# Patient Record
Sex: MALE | Employment: FULL TIME | URBAN - METROPOLITAN AREA
[De-identification: names, ages, dates, MRNs, and addresses within clinical notes are randomized per-mention and may not be internally consistent; named-entity substitution may affect disease eponyms.]

---

## 2018-09-04 ENCOUNTER — OFFICE VISIT (OUTPATIENT)
Dept: ORTHOPEDIC SURGERY | Age: 33
End: 2018-09-04

## 2018-09-04 ENCOUNTER — HOSPITAL ENCOUNTER (OUTPATIENT)
Dept: OCCUPATIONAL MEDICINE | Age: 33
Discharge: HOME | End: 2018-09-04
Attending: FAMILY MEDICINE

## 2018-09-04 VITALS
SYSTOLIC BLOOD PRESSURE: 132 MMHG | DIASTOLIC BLOOD PRESSURE: 86 MMHG | HEART RATE: 69 BPM | BODY MASS INDEX: 34.27 KG/M2 | HEIGHT: 72 IN | TEMPERATURE: 97.2 F | WEIGHT: 253 LBS | RESPIRATION RATE: 15 BRPM

## 2018-09-04 DIAGNOSIS — S39.012A LUMBAR STRAIN, INITIAL ENCOUNTER: ICD-10-CM

## 2018-09-04 DIAGNOSIS — M99.03 LUMBAR REGION SOMATIC DYSFUNCTION: ICD-10-CM

## 2018-09-04 DIAGNOSIS — M51.36 ANNULAR TEAR OF LUMBAR DISC: ICD-10-CM

## 2018-09-04 DIAGNOSIS — M99.04 SACRAL REGION SOMATIC DYSFUNCTION: ICD-10-CM

## 2018-09-04 DIAGNOSIS — M51.36 ANNULAR TEAR OF LUMBAR DISC: Primary | ICD-10-CM

## 2018-09-04 DIAGNOSIS — M99.05 PELVIC SOMATIC DYSFUNCTION: ICD-10-CM

## 2018-09-04 RX ORDER — ALBUTEROL SULFATE 90 UG/1
AEROSOL, METERED RESPIRATORY (INHALATION)
COMMUNITY

## 2018-09-04 RX ORDER — MELOXICAM 15 MG/1
TABLET ORAL
Qty: 90 TAB | Refills: 0 | Status: SHIPPED | OUTPATIENT
Start: 2018-09-04 | End: 2022-01-04

## 2018-09-04 NOTE — MR AVS SNAPSHOT
[address] [address] [facility] [address] 
[phone] Patient: Gustabo Ross MRN: [id number] :1985 Visit Information Date & Time Provider Department Dept. Phone Encounter #  
 2018  9:00 AM Shiva Villagran, Celina Carrillo [facility] [phone] Follow-up Instructions Return for after MRI lumbar for results. Upcoming Health Maintenance Date Due DTaP/Tdap/Td series (1 - Tdap) 10/3/2007 Influenza Age 5 to Adult 2018 Allergies as of 2018  Review Complete On: 2018 By: Shiva Villagran DO No Known Allergies Current Immunizations  Never Reviewed No immunizations on file. Not reviewed this visit You Were Diagnosed With   
  
 Codes Comments Annular tear of lumbar disc    -  Primary ICD-10-CM: M51.36 
ICD-9-CM: 722.52 Lumbar strain, initial encounter     ICD-10-CM: S39.012A ICD-9-CM: 847.2 Lumbar region somatic dysfunction     ICD-10-CM: M99.03 
ICD-9-CM: 739.3 Sacral region somatic dysfunction     ICD-10-CM: M99.04 
ICD-9-CM: 739.4 Pelvic somatic dysfunction     ICD-10-CM: M99.05 
ICD-9-CM: 739.5 Vitals BP Pulse Temp Resp Height(growth percentile) Weight(growth percentile) 132/86 69 97.2 °F (36.2 °C) 15 6' (1.829 m) 253 lb (114.8 kg) BMI Smoking Status 34.31 kg/m2 Never Smoker Vitals History BMI and BSA Data Body Mass Index Body Surface Area  
 34.31 kg/m 2 2.41 m 2 Preferred Pharmacy Pharmacy Name Phone Angelina Swanson [address], [address] [phone] Your Updated Medication List  
  
   
This list is accurate as of 18 10:22 AM.  Always use your most recent med list.  
  
  
  
  
 albuterol 90 mcg/actuation inhaler Commonly known as:  PROVENTIL HFA, VENTOLIN HFA, PROAIR HFA Take  by inhalation. ARTHRITIS STRENGTH BC POWDER PO Take  by mouth.  
  
 meloxicam 15 mg tablet Commonly known as:  MOBIC Take 1 tab daily as needed pain with food. Prescriptions Sent to Pharmacy Refills  
 meloxicam (MOBIC) 15 mg tablet 0 Sig: Take 1 tab daily as needed pain with food. Class: Normal  
 Pharmacy: [facility] [address] [facility], [address] [place] [address] #: [phone] We Performed the Following [place] OSTEOPATHIC MANIP,3-4 BODY REGN [id number] CPT(R)] Follow-up Instructions Return for after MRI lumbar for results. To-Do List   
 09/04/2018 Imaging:  XR SPINE LUMB 2 OR 3 V Introducing [facility] SERVICES! Dear Suraj Martinez: Thank you for requesting a Iterable account. Our records indicate that you already have an active Iterable account. You can access your account anytime at [email or web address] Did you know that you can access your hospital and ER discharge instructions at any time in Iterable? You can also review all of your test results from your hospital stay or ER visit. Additional Information If you have questions, please visit the Frequently Asked Questions section of the Iterable website at [email or web address] Remember, Iterable is NOT to be used for urgent needs. For medical emergencies, dial 911. Now available from your iPhone and Android! Please provide this summary of care documentation to your next provider. If you have any questions after today's visit, please call [phone].

## 2018-09-04 NOTE — PROGRESS NOTES
HISTORY OF PRESENT ILLNESS    Kehinde Cheema is a 33y.o. year old male comes in today as new patient for: low back pain    Patients symptoms have been present for 2 months. Pain level 7/10 low back, It has slightly improved with chiro and stretching. It is described as pain ow back after significant exercises 2 months ago using 25# plate and over did it. IMAGING: XR lumbar today shows minimal degerative changes w/ good alignment per my review. Social History     Social History    Marital status: UNKNOWN     Spouse name: N/A    Number of children: N/A    Years of education: N/A     Social History Main Topics    Smoking status: Never Smoker    Smokeless tobacco: Never Used    Alcohol use 8.4 oz/week     14 Cans of beer per week    Drug use: No    Sexual activity: Not Asked     Other Topics Concern    None     Social History Narrative    None     Current Outpatient Prescriptions   Medication Sig Dispense Refill    aspirin/salicylamide/caffeine (ARTHRITIS STRENGTH BC POWDER PO) Take  by mouth.  albuterol (PROVENTIL HFA, VENTOLIN HFA, PROAIR HFA) 90 mcg/actuation inhaler Take  by inhalation. Past Medical History:   Diagnosis Date    Asthma      History reviewed. No pertinent family history. ROS:  No num, tingle, incont, fever. All other systems reviewed and negative aside from that written in the HPI. Objective:  /86  Pulse 69  Temp 97.2 °F (36.2 °C)  Resp 15  Ht 6' (1.829 m)  Wt 253 lb (114.8 kg)  BMI 34.31 kg/m2  GEN:  Appears stated age in NAD. NEURO:  Sensation intact to light touch. Reflexes +1/4 patellar and Achilles bilaterally. M/S:  Examined seated and supine. Slump negative. Standing flexion test positive left  Stork positive left.   ASIS high left  Iliac crests high left Pubes high left Medial malleolus high left  Sacral base posterior left  ANA low left  Sphinx test Positive left TTA at L2, 4, 5 on left worse flexion Rib(s) not TTP and equal LE Strength +5/5 bilaterally Piriformis normal bilaterally  EXT:  no clubbing/cyanosis. no edema. SKIN: Warm & dry w/o rash. HEENT: Conjunctiva/lids WNL. External canals/nares WNL. Tongue midline. PERRL, EOMI. Hearing intact. NECK: Trachea midline. Supple, Full ROM. No thyromegaly. CARDIAC: No edema. LUNGS: Normal effort. ABD: Soft, no masses. No HSM. PSYCH: A+O x3. Appropriate judgment and insight. Assessment/Plan:     ICD-10-CM ICD-9-CM    1. Annular tear of lumbar disc M51.36 722.52    2. Lumbar strain, initial encounter S39.012A 847.2    3. Lumbar region somatic dysfunction M99.03 739.3    4. Sacral region somatic dysfunction M99.04 739.4    5. Pelvic somatic dysfunction M99.05 739.5      Orders Placed This Encounter    XR SPINE LUMB 2 OR 3 V     Standing Status:   Future     Standing Expiration Date:   10/5/2019     Order Specific Question:   Reason for Exam     Answer:   Significant lumbar pain 8 weeks despite conservative treatment. Order Specific Question:   Is Patient Allergic to Contrast Dye? Answer:   Unknown    [place] OSTEOPATHIC MANIP,3-4 BODY REGN    meloxicam (MOBIC) 15 mg tablet     Sig: Take 1 tab daily as needed pain with food. Dispense:  90 Tab     Refill:  0     Patient (or guardian if minor) verbalizes understanding of evaluation and plan. Verbal consent obtained. Lumbar, Pelvic and Sacral SD treated with ME and HVLA. Correction of previous malalignments verified after Tx. Pt tolerated well. Notes improvement of Sx and pain is now rated 6-7/10. HEP/stretches daily. Discussed stretching/strengthening/posture.

## 2018-09-12 ENCOUNTER — HOSPITAL ENCOUNTER (OUTPATIENT)
Age: 33
Discharge: HOME | End: 2018-09-12
Attending: FAMILY MEDICINE
Payer: COMMERCIAL

## 2018-09-12 DIAGNOSIS — S39.012A LUMBAR STRAIN, INITIAL ENCOUNTER: ICD-10-CM

## 2018-09-12 DIAGNOSIS — M51.36 ANNULAR TEAR OF LUMBAR DISC: ICD-10-CM

## 2018-09-12 PROCEDURE — 72148 MRI LUMBAR SPINE W/O DYE: CPT

## 2018-09-25 ENCOUNTER — OFFICE VISIT (OUTPATIENT)
Dept: ORTHOPEDIC SURGERY | Age: 33
End: 2018-09-25

## 2018-09-25 VITALS
DIASTOLIC BLOOD PRESSURE: 78 MMHG | HEART RATE: 84 BPM | RESPIRATION RATE: 15 BRPM | HEIGHT: 72 IN | TEMPERATURE: 98 F | WEIGHT: 254 LBS | BODY MASS INDEX: 34.4 KG/M2 | SYSTOLIC BLOOD PRESSURE: 125 MMHG

## 2018-09-25 DIAGNOSIS — M51.36 BULGING LUMBAR DISC: Primary | ICD-10-CM

## 2018-09-25 DIAGNOSIS — M47.816 SPONDYLOSIS OF LUMBAR REGION WITHOUT MYELOPATHY OR RADICULOPATHY: ICD-10-CM

## 2018-09-25 DIAGNOSIS — M99.03 LUMBAR REGION SOMATIC DYSFUNCTION: ICD-10-CM

## 2018-09-25 DIAGNOSIS — M99.05 PELVIC SOMATIC DYSFUNCTION: ICD-10-CM

## 2018-09-25 DIAGNOSIS — M99.04 SACRAL REGION SOMATIC DYSFUNCTION: ICD-10-CM

## 2018-09-25 NOTE — PROGRESS NOTES
HISTORY OF PRESENT ILLNESS    Shannon Kearney is a 33y.o. year old male comes in today to be evaluated and treated for: Low back pan/MRI results    Sinc elast appt pain improved and down to 4/10 but minimal activity. No numb/tingle/swell/bruise. IMAGING: MRI Lumbar 9/12/18  IMPRESSION:  1. Far right lateral disc osteophyte complex of L1-2 with mild discogenic  endplate edema, a potential source of back pain. Mild amount of adjacent psoas  muscle strain. 2. Otherwise minimal degenerative disc disease.  -Patent canal and foramina    Social History     Social History    Marital status: UNKNOWN     Spouse name: N/A    Number of children: N/A    Years of education: N/A     Social History Main Topics    Smoking status: Never Smoker    Smokeless tobacco: Never Used    Alcohol use 8.4 oz/week     14 Cans of beer per week    Drug use: No    Sexual activity: Not Asked     Other Topics Concern    None     Social History Narrative     Current Outpatient Prescriptions   Medication Sig Dispense Refill    aspirin/salicylamide/caffeine (ARTHRITIS STRENGTH BC POWDER PO) Take  by mouth.  albuterol (PROVENTIL HFA, VENTOLIN HFA, PROAIR HFA) 90 mcg/actuation inhaler Take  by inhalation.  meloxicam (MOBIC) 15 mg tablet Take 1 tab daily as needed pain with food. 80 Tab 0     Past Medical History:   Diagnosis Date    Asthma      History reviewed. No pertinent family history. ROS:  See HPI    Objective:  /78  Pulse 84  Temp 98 °F (36.7 °C)  Resp 15  Ht 6' (1.829 m)  Wt 254 lb (115.2 kg)  BMI 34.45 kg/m2  GEN:  Appears stated age in NAD. NEURO:  Sensation intact to light touch. Reflexes +1/4 patellar and Achilles bilaterally. M/S:  Examined seated and supine. Slump negative. Standing flexion test positive right Stork positive left.   ASIS high left  Iliac crests = Pubes high left Medial malleolus high left  Sacral base posterior left  ANA low left  Sphinx test Positive left TTA at L4 on left worse flexion Rib(s) not TTP and equal LE Strength +5/5 bilaterally Piriformis normal bilaterally  EXT:  no clubbing/cyanosis. no edema. Assessment/Plan:     ICD-10-CM ICD-9-CM    1. Bulging lumbar disc M51.26 722.10 REFERRAL TO ORTHOPEDICS   2. Spondylosis of lumbar region without myelopathy or radiculopathy M47.816 721.3 REFERRAL TO ORTHOPEDICS   3. Pelvic somatic dysfunction M99.05 739.5 HI OSTEOPATHIC MANIP,3-4 BODY REGN   4. Sacral region somatic dysfunction M99.04 739.4 HI OSTEOPATHIC MANIP,3-4 BODY REGN   5. Lumbar region somatic dysfunction M99.03 739.3 HI OSTEOPATHIC MANIP,3-4 BODY REGN     Patient (or guardian if minor) verbalizes understanding of evaluation and plan. Verbal consent obtained. Lumbar, Pelvic and Sacral SD treated with ME. Correction of previous malalignments verified after Tx. Pt tolerated well. Notes improvement of Sx and pain is now rated 3/10. HEP/stretches daily. Discussed stretching/strengthening/posture. Will refer PMR as prefers eval to PT as wants to continue to be able to lift weights. Mobic PRN. Return as needed.

## 2018-09-25 NOTE — MR AVS SNAPSHOT
[address] [address] [place] [address] 
[phone] Patient: Jorge A Hernandez MRN: [id number] :1985 Visit Information Date & Time Provider Department Dept. Phone Encounter #  
 2018  9:00 AM Celina [facility] [phone] Follow-up Instructions Return if symptoms worsen or fail to improve. Follow-up and Disposition History Upcoming Health Maintenance Date Due DTaP/Tdap/Td series (1 - Tdap) 10/3/2007 Influenza Age 5 to Adult 2018 Allergies as of 2018  Review Complete On: 2018 By: Reba Cronin DO No Known Allergies Current Immunizations  Never Reviewed No immunizations on file. Not reviewed this visit You Were Diagnosed With   
  
 Codes Comments Bulging lumbar disc    -  Primary ICD-10-CM: M51.26 
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 
ICD-9-CM: 739.5 Sacral region somatic dysfunction     ICD-10-CM: M99.04 
ICD-9-CM: 739.4 Lumbar region somatic dysfunction     ICD-10-CM: M99.03 
ICD-9-CM: 739.3 Vitals BP Pulse Temp Resp Height(growth percentile) Weight(growth percentile) 125/78 84 98 °F (36.7 °C) 15 6' (1.829 m) 254 lb (115.2 kg) BMI Smoking Status 34.45 kg/m2 Never Smoker Vitals History BMI and BSA Data Body Mass Index Body Surface Area 34.45 kg/m 2 2.42 m 2 Preferred Pharmacy Pharmacy Name Phone Cassius Swift [address], [address] [facility] [phone] Your Updated Medication List  
  
   
This list is accurate as of 18  9:32 AM.  Always use your most recent med list.  
  
  
  
  
 albuterol 90 mcg/actuation inhaler Commonly known as:  PROVENTIL HFA, VENTOLIN HFA, PROAIR HFA Take  by inhalation. ARTHRITIS STRENGTH BC POWDER PO Take  by mouth.  
  
 meloxicam 15 mg tablet Commonly known as:  MOBIC Take 1 tab daily as needed pain with food. We Performed the Following [place] OSTEOPATHIC MANIP,3-4 BODY REGN [id number] CPT(R)] REFERRAL TO ORTHOPEDICS [VBZ147 Custom] Follow-up Instructions Return if symptoms worsen or fail to improve. Referral Information Referral ID Referred By Referred To  
  
 [id number] NAKIA PICHARDO [facility], . Milli Serrano MD   
   [address] Phone: [phone] Fax: [phone] Visits Status Start Date End Date 1 New Request 9/25/18 9/25/19 If your referral has a status of pending review or denied, additional information will be sent to support the outcome of this decision. Patient Instructions Low Back Arthritis: Exercises Your Care Instructions Here are some examples of typical rehabilitation exercises for your condition. Start each exercise slowly. Ease off the exercise if you start to have pain. Your doctor or physical therapist will tell you when you can start these exercises and which ones will work best for you. When you are not being active, find a comfortable position for rest. Some people are comfortable on the floor or a medium-firm bed with a small pillow under their head and another under their knees. Some people prefer to lie on their side with a pillow between their knees. Don't stay in one position for too long. Take short walks (10 to 20 minutes) every 2 to 3 hours. Avoid slopes, hills, and stairs until you feel better. Walk only distances you can manage without pain, especially leg pain. How to do the exercises Pelvic tilt 1. Lie on your back with your knees bent. 2.  \"Brace\" your stomach-tighten your muscles by pulling in and imagining your belly button moving toward your spine. 3. Press your lower back into the floor. You should feel your hips and pelvis rock back. 4. Hold for 6 seconds while breathing smoothly. 5. Relax and allow your pelvis and hips to rock forward. 6. Repeat 8 to 12 times. Back stretches 1. Get down on your hands and knees on the floor. 2. Relax your head and allow it to droop. Round your back up toward the ceiling until you feel a nice stretch in your upper, middle, and lower back. Hold this stretch for as long as it feels comfortable, or about 15 to 30 seconds. 3. Return to the starting position with a flat back while you are on your hands and knees. 4. Let your back sway by pressing your stomach toward the floor. Lift your buttocks toward the ceiling. 5. Hold this position for 15 to 30 seconds. 6. Repeat 2 to 4 times. Follow-up care is a key part of your treatment and safety. Be sure to make and go to all appointments, and call your doctor if you are having problems. It's also a good idea to know your test results and keep a list of the medicines you take. Where can you learn more? Go to http://jesse-alla.info/. Enter L047 in the search box to learn more about \"Low Back Arthritis: Exercises. \" Current as of: November 29, 2017 Content Version: 11.7 © 4431-4319 [employer]. Care instructions adapted under license by [employer] (which disclaims liability or warranty for this information). If you have questions about a medical condition or this instruction, always ask your healthcare professional. [place] [address] any warranty or liability for your use of this information. Introducing [facility] SERVICES! Dear Fabrizio Jimenez: Thank you for requesting a CyrusOne account. Our records indicate that you already have an active CyrusOne account. You can access your account anytime at [email or web address] Did you know that you can access your hospital and ER discharge instructions at any time in uSamp? You can also review all of your test results from your hospital stay or ER visit. Additional Information If you have questions, please visit the Frequently Asked Questions section of the uSamp website at [email or web address] Remember, uSamp is NOT to be used for urgent needs. For medical emergencies, dial 911. Now available from your iPhone and Android! Please provide this summary of care documentation to your next provider. Your primary care clinician is listed as NONE. If you have any questions after today's visit, please call [phone].

## 2018-09-25 NOTE — PATIENT INSTRUCTIONS
Low Back Arthritis: Exercises  Your Care Instructions  Here are some examples of typical rehabilitation exercises for your condition. Start each exercise slowly. Ease off the exercise if you start to have pain. Your doctor or physical therapist will tell you when you can start these exercises and which ones will work best for you. When you are not being active, find a comfortable position for rest. Some people are comfortable on the floor or a medium-firm bed with a small pillow under their head and another under their knees. Some people prefer to lie on their side with a pillow between their knees. Don't stay in one position for too long. Take short walks (10 to 20 minutes) every 2 to 3 hours. Avoid slopes, hills, and stairs until you feel better. Walk only distances you can manage without pain, especially leg pain. How to do the exercises  Pelvic tilt    1. Lie on your back with your knees bent. 2. \"Brace\" your stomach-tighten your muscles by pulling in and imagining your belly button moving toward your spine. 3. Press your lower back into the floor. You should feel your hips and pelvis rock back. 4. Hold for 6 seconds while breathing smoothly. 5. Relax and allow your pelvis and hips to rock forward. 6. Repeat 8 to 12 times. Back stretches    1. Get down on your hands and knees on the floor. 2. Relax your head and allow it to droop. Round your back up toward the ceiling until you feel a nice stretch in your upper, middle, and lower back. Hold this stretch for as long as it feels comfortable, or about 15 to 30 seconds. 3. Return to the starting position with a flat back while you are on your hands and knees. 4. Let your back sway by pressing your stomach toward the floor. Lift your buttocks toward the ceiling. 5. Hold this position for 15 to 30 seconds. 6. Repeat 2 to 4 times. Follow-up care is a key part of your treatment and safety.  Be sure to make and go to all appointments, and call your doctor if you are having problems. It's also a good idea to know your test results and keep a list of the medicines you take. Where can you learn more? Go to http://jesse-alla.info/. Enter N220 in the search box to learn more about \"Low Back Arthritis: Exercises. \"  Current as of: November 29, 2017  Content Version: 11.7  © 7474-4048 [employer]. Care instructions adapted under license by [facility] (which disclaims liability or warranty for this information). If you have questions about a medical condition or this instruction, always ask your healthcare professional. [address] any warranty or liability for your use of this information.

## 2018-11-13 ENCOUNTER — OFFICE VISIT (OUTPATIENT)
Dept: ORTHOPEDIC SURGERY | Age: 33
End: 2018-11-13

## 2018-11-13 VITALS
TEMPERATURE: 98.3 F | BODY MASS INDEX: 34.54 KG/M2 | HEART RATE: 71 BPM | HEIGHT: 72 IN | SYSTOLIC BLOOD PRESSURE: 145 MMHG | WEIGHT: 255 LBS | DIASTOLIC BLOOD PRESSURE: 89 MMHG

## 2018-11-13 DIAGNOSIS — M51.36 BULGE OF LUMBAR DISC WITHOUT MYELOPATHY: ICD-10-CM

## 2018-11-13 DIAGNOSIS — M51.36 DDD (DEGENERATIVE DISC DISEASE), LUMBAR: ICD-10-CM

## 2018-11-13 DIAGNOSIS — M47.816 LUMBAR SPONDYLOSIS: Primary | ICD-10-CM

## 2018-11-13 NOTE — PATIENT INSTRUCTIONS
Low Back Arthritis: Exercises Your Care Instructions Here are some examples of typical rehabilitation exercises for your condition. Start each exercise slowly. Ease off the exercise if you start to have pain. Your doctor or physical therapist will tell you when you can start these exercises and which ones will work best for you. When you are not being active, find a comfortable position for rest. Some people are comfortable on the floor or a medium-firm bed with a small pillow under their head and another under their knees. Some people prefer to lie on their side with a pillow between their knees. Don't stay in one position for too long. Take short walks (10 to 20 minutes) every 2 to 3 hours. Avoid slopes, hills, and stairs until you feel better. Walk only distances you can manage without pain, especially leg pain. How to do the exercises Pelvic tilt 1. Lie on your back with your knees bent. 2. \"Brace\" your stomachtighten your muscles by pulling in and imagining your belly button moving toward your spine. 3. Press your lower back into the floor. You should feel your hips and pelvis rock back. 4. Hold for 6 seconds while breathing smoothly. 5. Relax and allow your pelvis and hips to rock forward. 6. Repeat 8 to 12 times. Back stretches 1. Get down on your hands and knees on the floor. 2. Relax your head and allow it to droop. Round your back up toward the ceiling until you feel a nice stretch in your upper, middle, and lower back. Hold this stretch for as long as it feels comfortable, or about 15 to 30 seconds. 3. Return to the starting position with a flat back while you are on your hands and knees. 4. Let your back sway by pressing your stomach toward the floor. Lift your buttocks toward the ceiling. 5. Hold this position for 15 to 30 seconds. 6. Repeat 2 to 4 times. Follow-up care is a key part of your treatment and safety.  Be sure to make and go to all appointments, and call your doctor if you are having problems. It's also a good idea to know your test results and keep a list of the medicines you take. Where can you learn more? Go to http://jesse-alla.info/. Enter V454 in the search box to learn more about \"Low Back Arthritis: Exercises. \" Current as of: November 29, 2017 Content Version: 11.8 © 6479-9139 [employer], [employer]. Care instructions adapted under license by [employer] (which disclaims liability or warranty for this information). If you have questions about a medical condition or this instruction, always ask your healthcare professional. [address] any warranty or liability for your use of this information.

## 2018-11-13 NOTE — PROGRESS NOTES
[address] 
[address] [place], [address] [place] [address] Street Phone: [phone] Fax: [phone] NEW PATIENT Pt's YOB: 1985 ASSESSMENT Diagnoses and all orders for this visit: 1. Lumbar spondylosis 2. Bulge of lumbar disc without myelopathy 3. DDD (degenerative disc disease), lumbar IMPRESSION AND PLAN: 
Edi Villanueva is a 29 y.o. male with history of low back pain. He complains of pain in the lower back but denies any pain, numbness, or tingling in the legs at this time. Pt notes report with Mobic and BC powders. 1) Pt was given information on lumbar arthritis exercises. 2) He was informed of proper lifting mechanics; interventional blocks also discussed if needed 3) I recommended the patient to gradually increase intensity of his workouts when he decides to return to the gym. 4) I recommended the patient try water exercise. 5) I encouraged the patient to try an inversion table if needed. 6) He will continue taking Mobic as prescribed and does not need a refill. 7) Mr. Chau Atwood has a reminder for a \"due or due soon\" health maintenance. I have asked that he contact his primary care provider, None, for follow-up on this health maintenance. 8)  demonstrated consistency with prescribing. 9) Pt will follow-up as needed. HISTORY OF PRESENT ILLNESS: 
Edi Villanueva is a 29 y.o. male with history of low back pain. Pt presents to the office today as a new patient referred by Dr. Gary Salamanca. He complains of pain in the lower back but denies any pain, numbness, or tingling in the legs at this time. His pain is worse when twisting, bending forward, and with lumbar extension. Pt notes that his pain is generally dull and achy but with certain maneuvers his pain is sharp and severe.  He denies any inciting injuries, admits to lower back pain for some time, but notes that his pain worsened after he performed a certain workout over the summer. Of note, he is in [employer]. Pt notes that his pain is manageable at this time but states that he has not been to the gym for about 2.5-3 months. He denies any chest pain, chest pressure, shortness of breath, tripping, falling, issues with balance, or weakness in the legs. Pt also denies any neck pain or arm weakness at this time. He admits to relief when using BC powders or Mobic. Pt has not tried physical therapy. Pt at this time desires to continue with current care. Of note, he is nonsmoker. 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 Pain Scale: 3/10 PCP: None Past Medical History:  
Diagnosis Date  Asthma Social History Socioeconomic History  Marital status: UNKNOWN Spouse name: Not on file  Number of children: Not on file  Years of education: Not on file  Highest education level: Not on file Social Needs  Financial resource strain: Not on file  Food insecurity - worry: Not on file  Food insecurity - inability: Not on file  Transportation needs - medical: Not on file  Transportation needs - non-medical: Not on file Occupational History  Not on file Tobacco Use  Smoking status: Never Smoker  Smokeless tobacco: Never Used Substance and Sexual Activity  Alcohol use: Yes Alcohol/week: 8.4 oz Types: 14 Cans of beer per week  Drug use: No  
 Sexual activity: Not on file Other Topics Concern  Not on file Social History Narrative  Not on file Current Outpatient Medications Medication Sig Dispense Refill  aspirin/salicylamide/caffeine (ARTHRITIS STRENGTH BC POWDER PO) Take  by mouth.  albuterol (PROVENTIL HFA, VENTOLIN HFA, PROAIR HFA) 90 mcg/actuation inhaler Take  by inhalation.  meloxicam (MOBIC) 15 mg tablet Take 1 tab daily as needed pain with food. 90 Tab 0 No Known Allergies REVIEW OF SYSTEMS 
 
 Constitutional: Negative for fever, chills, or weight change. Respiratory: Negative for cough or shortness of breath. Cardiovascular: Negative for chest pain or palpitations. Gastrointestinal: Negative for acid reflux, change in bowel habits, or constipation. Genitourinary: Negative for dysuria and flank pain. Musculoskeletal: Positive for lumbar pain. Skin: Negative for rash. Neurological: Negative for headaches, dizziness, or numbness. Endo/Heme/Allergies: Negative for increased bruising. Psychiatric/Behavioral: Negative for difficulty with sleep. PHYSICAL EXAMINATION Visit Vitals /89 Pulse 71 Temp 98.3 °F (36.8 °C) (Oral) Ht 6' (1.829 m) Wt 255 lb (115.7 kg) BMI 34.58 kg/m² Constitutional: Awake, alert, and in no acute distress. HEENT: Normocephalic. Atraumatic. Oropharynx is moist and clear. PERRL. EOMI. Sclerae are nonicteric Heart: Regular rate and rhythm Lungs: Clear to auscultation bilaterally Abdomen: Soft and nontender. Bowel sounds are present Neurological: 1+ symmetrical DTRs in the upper extremities. 1+ symmetrical DTRs in the lower extremities. Sensation to light touch is intact. Negative Teo's sign bilaterally. Skin: warm, dry, and intact. Musculoskeletal: Good range of motion in the cervical spine on all planes. Minimal tenderness to palpation in the lower lumbar region. Moderate pain with extension and axial loading, L>R. Moderate pain with forward flexion. No pain with internal or external rotation of his hips. Negative straight leg raise bilaterally. No pain with heel or toe walking. No difficulty with the single leg stand bilaterally. Biceps  Triceps Deltoids Wrist Ext Wrist Flex Hand Intrin Right +4/5 +4/5 +4/5 +4/5 +4/5 +4/5 Left +4/5 +4/5 +4/5 +4/5 +4/5 +4/5 Hip Flex  Quads Hamstrings Ankle DF EHL Ankle PF Right +4/5 +4/5 +4/5 +4/5 +4/5 +4/5 Left +4/5 +4/5 +4/5 +4/5 +4/5 +4/5 IMAGING: 
 
 Lumbar spine MRI from 09/21/2018 was personally reviewed with the patient and demonstrated: 
Results from [place] encounter on 09/12/18 MRI LUMB SPINE WO CONT Narrative MR Lumbar spine without contrast 
 
HISTORY: Severe lumbar pain 8 weeks not responding to conservative treatment. Lifting injury, pain is 7/10 intensity COMPARISON: Recent plain films Technique: Multi-sequence multiplanar T1, T2, STIR imaging with and without fat 
saturation obtained centered on the lumbar spine. FINDINGS:  
 
Alignment: Intact lordosis Vertebral body height: Normal 
Marrow signal: No focal lesion Disc spaces: There is moderately severe narrowing and desiccation of L1-2 with 
small Schmorl's nodes present. Conus: Terminates at inferior L1 Axial imaging correlation: 
 
T12-L1: Patent canal and foramina. L1-2: Minimal bilobed disc bulge. Superimposed upon this, there is far right 
lateral disc osteophyte complex beyond the confines of the foramen. Spondylosis 
evident here on recent plain films. There is some accompanying discogenic 
endplate edema primarily within upper L2. This finding might abut the exited 
right L1 nerve. Slight edema likely present within a portion of origin of right 
psoas muscle. Reference axial T2 series 5 images 31-36, and sagittal STIR series 4 images 9-12. Patent canal and foramina. L2-3: Patent canal and foramina. 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 
hypoplasia. No significant stenosis. Patent foramina. L4-5: Also slightly narrow caliber of the canal which may be from pedicle 
hypoplasia. Minor disc bulge. No significant spinal stenosis. Patent foramina. L5-S1: Patent canal and foramina. Other structures: Unremarkable. Impression IMPRESSION: 
 
1. Far right lateral disc osteophyte complex of L1-2 with mild discogenic 
endplate edema, a potential source of back pain. Mild amount of adjacent psoas muscle strain. 2. Otherwise minimal degenerative disc disease. 
-Patent canal and foramina Thank you for enabling us to participate in the care of this patient. Written by Gianni Kitchen, as dictated by Urmila Antunez MD. 
I, Dr. Urmila Antunez confirm that all documentation is accurate.

## 2022-01-04 ENCOUNTER — HOSPITAL ENCOUNTER (EMERGENCY)
Age: 37
Discharge: HOME | End: 2022-01-04
Attending: EMERGENCY MEDICINE
Payer: COMMERCIAL

## 2022-01-04 VITALS
DIASTOLIC BLOOD PRESSURE: 97 MMHG | SYSTOLIC BLOOD PRESSURE: 148 MMHG | BODY MASS INDEX: 36.4 KG/M2 | HEART RATE: 82 BPM | TEMPERATURE: 98.9 F | HEIGHT: 71 IN | WEIGHT: 260 LBS | RESPIRATION RATE: 18 BRPM | OXYGEN SATURATION: 97 %

## 2022-01-04 DIAGNOSIS — T30.0 BURN: Primary | ICD-10-CM

## 2022-01-04 PROCEDURE — 74011000250 HC RX REV CODE- 250: Performed by: EMERGENCY MEDICINE

## 2022-01-04 PROCEDURE — 99283 EMERGENCY DEPT VISIT LOW MDM: CPT

## 2022-01-04 RX ORDER — FEXOFENADINE HCL 60 MG
TABLET ORAL
COMMUNITY

## 2022-01-04 RX ORDER — SULFAMETHOXAZOLE AND TRIMETHOPRIM 800; 160 MG/1; MG/1
TABLET ORAL
COMMUNITY
Start: 2022-01-03

## 2022-01-04 RX ORDER — SILVER SULFADIAZINE 10 G/1000G
CREAM TOPICAL
COMMUNITY
Start: 2022-01-03

## 2022-01-04 RX ORDER — LEVOCETIRIZINE DIHYDROCHLORIDE 5 MG/1
TABLET, FILM COATED ORAL
COMMUNITY

## 2022-01-04 RX ORDER — SILVER SULFADIAZINE 10 G/1000G
CREAM TOPICAL
Status: COMPLETED | OUTPATIENT
Start: 2022-01-04 | End: 2022-01-04

## 2022-01-04 RX ADMIN — SILVER SULFADIAZINE: 10 CREAM TOPICAL at 12:22
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
## 2022-01-04 NOTE — ED NOTES
Dressing applied to back of right thigh and left buttock using silvadene cream, xeroform gauze, ABD pad, and tape.
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
